# Patient Record
Sex: MALE | Race: WHITE | NOT HISPANIC OR LATINO | Employment: OTHER | URBAN - METROPOLITAN AREA
[De-identification: names, ages, dates, MRNs, and addresses within clinical notes are randomized per-mention and may not be internally consistent; named-entity substitution may affect disease eponyms.]

---

## 2020-06-10 LAB — EXT SARS-COV-2: NOT DETECTED

## 2020-07-07 ENCOUNTER — TELEPHONE (OUTPATIENT)
Dept: GASTROENTEROLOGY | Facility: CLINIC | Age: 60
End: 2020-07-07

## 2020-07-07 ENCOUNTER — OFFICE VISIT (OUTPATIENT)
Dept: GASTROENTEROLOGY | Facility: CLINIC | Age: 60
End: 2020-07-07
Payer: COMMERCIAL

## 2020-07-07 VITALS
BODY MASS INDEX: 39.86 KG/M2 | HEART RATE: 63 BPM | TEMPERATURE: 98.4 F | HEIGHT: 66 IN | WEIGHT: 248 LBS | SYSTOLIC BLOOD PRESSURE: 138 MMHG | DIASTOLIC BLOOD PRESSURE: 78 MMHG

## 2020-07-07 DIAGNOSIS — Z94.0 KIDNEY TRANSPLANT RECIPIENT: ICD-10-CM

## 2020-07-07 DIAGNOSIS — Z12.11 SCREENING FOR COLON CANCER: ICD-10-CM

## 2020-07-07 DIAGNOSIS — R19.7 DIARRHEA, UNSPECIFIED TYPE: Primary | ICD-10-CM

## 2020-07-07 PROCEDURE — 99244 OFF/OP CNSLTJ NEW/EST MOD 40: CPT | Performed by: NURSE PRACTITIONER

## 2020-07-07 RX ORDER — ASPIRIN 325 MG
TABLET ORAL EVERY 24 HOURS
COMMUNITY

## 2020-07-07 RX ORDER — MYCOPHENOLATE MOFETIL 250 MG/1
CAPSULE ORAL EVERY 12 HOURS
COMMUNITY

## 2020-07-07 RX ORDER — AMLODIPINE BESYLATE 10 MG/1
TABLET ORAL EVERY 24 HOURS
COMMUNITY

## 2020-07-07 RX ORDER — CHOLESTYRAMINE 4 G/9G
1 POWDER, FOR SUSPENSION ORAL DAILY
Qty: 39 PACKET | Refills: 1 | Status: SHIPPED | OUTPATIENT
Start: 2020-07-07

## 2020-07-07 RX ORDER — METOPROLOL TARTRATE 50 MG/1
50 TABLET, FILM COATED ORAL 2 TIMES DAILY WITH MEALS
COMMUNITY
Start: 2020-06-17

## 2020-07-07 RX ORDER — REPAGLINIDE 0.5 MG/1
TABLET ORAL
COMMUNITY

## 2020-07-07 NOTE — TELEPHONE ENCOUNTER
Kitty, I prefer for this patient to be done the hospital   I spoke to Chester Saul and I think it would be in his best interest   Could you change him from our center to [facility] for the colonoscopy because he has had a history of a kidney transplant, thank you

## 2020-07-07 NOTE — PROGRESS NOTES
[facility] [facility] - Outpatient Consultation  Fortino Saavedra 60 y o  male MRN: [id number]  Encounter: [id number]    ASSESSMENT AND PLAN:      1  Diarrhea, unspecified type  Acute onset of diarrhea over the past month  Recent hospitalization at [facility] when he presented with diarrhea, dehydration and renal insufficiency  C diff negative at that time  CT scan at that time was negative for any acute pathology or colitis  Consider microscopic colitis or inflammatory bowel disease, hyperthyroidism or celiac disease     - cholestyramine (QUESTRAN) 4 g packet; Take 1 packet (4 g total) by mouth daily  Dispense: 39 packet; Refill: 1  - 2 probiotics daily  -will arrange for a colonoscopy in hospital setting    2  Kidney transplant recipient  On chronic immunosuppression for kidney transplant in January 2018 at [facility]  No longer is following with them  3  Acute on chronic kidney injury   Recent acute kidney injury requiring hospitalization for dehydration  Creatinine during hospitalization was 1 7 to and now has decreased to 1 3  Follows with Dr Elkin Hough from [address] [place] [address]  4  Screening for colon cancer  5  History of colon polyps  Last colonoscopy at the [facility] was in April of 2017 and identified a hyperplastic colon polyp  A 5 year recall advised  Followup Appointment:  Following procedure  ______________________________________________________________________    Chief Complaint   Patient presents with    Diarrhea     1 1/2 hours post prandial; x 1 month    Referred by PCP Dr Dwayne Toledo  Kidney transplant     [facility] kidney transplant       HPI:   Fortino Saavedra is a 60y o  year old male who presents with an acute onset of diarrhea that has been ongoing for the past 3-4 weeks  Postprandial   Associated with gas and bloating  Denies actual abdominal pain, melena rectal bleeding    Does report a sick contact with the sister also having diarrhea  Denies travel  No new medication  Hospitalized at [facility] for 2 days for hydration and  IV antibiotic empirically  Was empirically placed on couple day course of Metronidazole after he left the emergency room  Typically experiencing  2 to 3 times loose stools a day  Can be watery but also soft and mushy at times  Denies night wakening  Initially had fecal incontinence but this has not been an issue for some time  Seems to be slowly improving  Intentional weight loss of 18 lb over the past couple of months on weight watchers  The diarrhea improved yesterday but then returned this morning  He did admit to eating 2 pieces of pizza last evening for supper  Historical Information   Past Medical History:   Diagnosis Date    [facility]     Stage I no treatment necessary    End stage kidney disease ([facility] )     History of, status post kidney transplant January of 2018 at [address] [place] [address] Hypertension      Past Surgical History:   Procedure Laterality Date    COLONOSCOPY  04/12/2017    [facility]  Small hyperplastic colon polyp and internal hemorrhoids    A 5 year recall advised due to prior history of adenomatous colon polyps    NEPHRECTOMY TRANSPLANTED ORGAN       Social History     Substance and Sexual Activity   Alcohol Use Not on file     Social History     Substance and Sexual Activity   Drug Use Not on file     Social History     Tobacco Use   Smoking Status Current Some Day Smoker   Smokeless Tobacco Never Used     Family History   Problem Relation Age of Onset    Obesity Mother     Emphysema Father     Colon cancer Neg Hx     Colon polyps Neg Hx        Meds/Allergies     Current Outpatient Medications:     Acetaminophen 325 MG CAPS    amLODIPine (NORVASC) 10 mg tablet    aspirin 325 mg tablet    metoprolol tartrate (LOPRESSOR) 50 mg tablet    mycophenolate (CELLCEPT) 250 mg capsule    repaglinide (PRANDIN) 0 5 mg tablet    tacrolimus (PROGRAF) 1 mg/ml oral suspension    cholestyramine (QUESTRAN) 4 g packet    No Known Allergies    PHYSICAL EXAM:    Blood pressure 138/78, pulse 63, temperature 98 4 °F (36 9 °C), height 5' 6" (1 676 m), weight 112 kg (248 lb)  Body mass index is 40 03 kg/m²  General Appearance: NAD, cooperative, alert  Eyes: Anicteric  ENT:  Normocephalic - class 2 airway  Neck:  Supple,    Resp:  Clear to auscultation bilaterally; no rales, rhonchi or wheezing; respirations unlabored   CV:  S1 S2, Regular rate and rhythm; no murmur, rub, or gallop  GI:  Soft, non-tender, non-distended; normal bowel sounds; no masses, no organomegaly   Rectal: Deferred  Musculoskeletal: No cyanosis, clubbing or edema  Normal ROM  Skin:  No jaundice, rashes, or lesions   Heme/Lymph: No palpable cervical lymphadenopathy  Psych: Normal affect, good eye contact  Neuro: No gross deficits, AAOx3    Lab Results:   No results found for: WBC, HGB, HCT, MCV, PLT  No results found for: NA, K, CL, CO2, ANIONGAP, BUN, CREATININE, GLUCOSE, GLUF, CALCIUM, CORRECTEDCA, AST, ALT, ALKPHOS, PROT, BILITOT, EGFR  No results found for: IRON, TIBC, FERRITIN  No results found for: LIPASE    Radiology Results:   No results found  REVIEW OF SYSTEMS:    CONSTITUTIONAL: Denies any fever, chills, rigors, and weight loss  HEENT: No earache or tinnitus  Denies hearing loss or visual disturbances  CARDIOVASCULAR: No chest pain or palpitations  RESPIRATORY: Denies any cough, hemoptysis, shortness of breath or dyspnea on exertion  GASTROINTESTINAL: As noted in the History of Present Illness  GENITOURINARY: No problems with urination  Denies any hematuria or dysuria  NEUROLOGIC: No dizziness or vertigo, denies headaches  MUSCULOSKELETAL: Denies any muscle or joint pain  SKIN: Denies skin rashes or itching  ENDOCRINE: Denies excessive thirst  Denies intolerance to heat or cold  PSYCHOSOCIAL: Denies depression or anxiety  Denies any recent memory loss

## 2020-07-08 NOTE — TELEPHONE ENCOUNTER
Called patient and rescheduled for [facility] for next available on 8/11 with Dr Lamberto Campos  Patient aware Diego Turner will contact to schedule covid test and a nurse will call him 8/4  He was very impressed and appreciative of our office and thanked us

## 2020-07-10 NOTE — TELEPHONE ENCOUNTER
Rec'd transplant clearance; pt is to continuie anti-rejection meds despite prep per nephrologist; I confirmed so with pt; he verbalized understanding

## 2020-08-04 ENCOUNTER — CLINICAL SUPPORT (OUTPATIENT)
Dept: GASTROENTEROLOGY | Facility: CLINIC | Age: 60
End: 2020-08-04

## 2020-08-04 VITALS — WEIGHT: 248 LBS | HEIGHT: 66 IN | BODY MASS INDEX: 39.86 KG/M2

## 2020-08-04 DIAGNOSIS — R19.7 DIARRHEA, UNSPECIFIED TYPE: Primary | ICD-10-CM

## 2020-08-04 NOTE — PROGRESS NOTES
Phone prep with pt  Dx: diarrhea  Rx sent to provider for signature  Instructions for colyte given  Meds reviewed  Instructions emailed to pt

## 2020-08-26 ENCOUNTER — TELEPHONE (OUTPATIENT)
Dept: GASTROENTEROLOGY | Facility: CLINIC | Age: 60
End: 2020-08-26

## 2020-08-26 ENCOUNTER — OFFICE VISIT (OUTPATIENT)
Dept: GASTROENTEROLOGY | Facility: CLINIC | Age: 60
End: 2020-08-26
Payer: COMMERCIAL

## 2020-08-26 VITALS
BODY MASS INDEX: 38.09 KG/M2 | HEIGHT: 66 IN | TEMPERATURE: 97.3 F | WEIGHT: 237 LBS | DIASTOLIC BLOOD PRESSURE: 74 MMHG | SYSTOLIC BLOOD PRESSURE: 118 MMHG

## 2020-08-26 DIAGNOSIS — R19.7 DIARRHEA, UNSPECIFIED TYPE: Primary | ICD-10-CM

## 2020-08-26 DIAGNOSIS — Z12.11 SCREENING FOR COLON CANCER: ICD-10-CM

## 2020-08-26 DIAGNOSIS — Z94.0 KIDNEY TRANSPLANT RECIPIENT: ICD-10-CM

## 2020-08-26 PROCEDURE — 99214 OFFICE O/P EST MOD 30 MIN: CPT | Performed by: NURSE PRACTITIONER

## 2020-08-26 NOTE — TELEPHONE ENCOUNTER
Pt was ordered labs, stools, breath test, and then egd  Pt did not want to schedule procedure until after the other tests were complete  egd is to be done at [facility]  Pt had a transplant      ce

## 2020-08-26 NOTE — PROGRESS NOTES
[facility] - Outpatient Follow-up Note  Riddhi Oshea 63 y o  male MRN: [id number]  Encounter: [id number]    ASSESSMENT AND PLAN:      1  Diarrhea, unspecified type  Patient reports a history of diarrhea for the past 3-6 months, new onset  Associated with 30 lb weight loss  Severe requiring 2 recent hospitalizations during this time frame when he presented with diarrhea and subsequent dehydration/renal insufficiency  C diff negative x2  CT scan negative for any acute pathology or colitis  A recent colonoscopy performed 2 weeks ago was negative for microscopic colitis or inflammatory bowel disease  Etiology unclear  Consider celiac disease or thyroid dysregulation  Small intestinal bacterial overgrowth possibility versus a malabsorption issue  Has tried and failed Questran and probiotics  - Advised that he try Imodium as needed  - Calprotectin,Fecal; Future  - Stool Enteric Bacterial Panel by PCR; Future  - Clostridium difficile toxin by PCR; Future  - Pancreatic elastase, fecal; Future  - CBC and differential; Future  - Comprehensive metabolic panel; Future  - Sedimentation rate, automated; Future  - C-reactive protein; Future  - Thyroid Panel With TSH; Future  - Celiac Disease Comprehensive Panel; Future  - Breath test to exclude small intestinal bacterial overgrowth    2  Kidney transplant recipient  On chronic immunosuppressive therapy for history of a kidney transplant in January of 2018 at [facility]  3  Early satiety  Early satiety and postprandial abdominal bloating occasional nausea over the past several months  Denies any vomiting  Exclude peptic ulcer disease     - arrange for an EGD to be performed in a hospital environment    4  Screening for colon cancer  5  History of colon polyps  Recent colonoscopy negative adenomatous colon polyps  However colonoscopy was a suboptimal examination due to poor preparation    A 1 year recall advised  Followup Appointment:  4-6 weeks  ______________________________________________________________________    Chief Complaint   Patient presents with    Follow-up     scope      HPI:    Returns to the office for follow-up ongoing issues with diarrhea  The patient believes this has been ongoing for least 3 months and possibly 6 months  Having 10 watery bowel movements daily  Associated night wakening  Associated 30 lb weight loss during this time frame  One episode of rectal bleeding a week ago  Denies any melena or lower abdominal pain  He has been following a bland diet without any alleviation  He tried probiotics and Questran without any improvement  Does admit to early satiety and postprandial abdominal fullness with occasional nausea without vomiting  Denies dysphagia or  Odynophagia  Historical Information   Past Medical History:   Diagnosis Date    CLL (chronic lymphocytic leukemia) ([place] [address] )     Stage I no treatment necessary    End stage kidney disease ([place] [address] )     History of, status post kidney transplant January of 2018 at [facility] Hypertension      Past Surgical History:   Procedure Laterality Date    COLONOSCOPY  04/12/2017    [facility]  Small hyperplastic colon polyp and internal hemorrhoids    A 5 year recall advised due to prior history of adenomatous colon polyps    NEPHRECTOMY TRANSPLANTED ORGAN       Social History     Substance and Sexual Activity   Alcohol Use None     Social History     Substance and Sexual Activity   Drug Use Not on file     Social History     Tobacco Use   Smoking Status Current Some Day Smoker   Smokeless Tobacco Never Used     Family History   Problem Relation Age of Onset    Obesity Mother     Emphysema Father     Colon cancer Neg Hx     Colon polyps Neg Hx          Current Outpatient Medications:     Acetaminophen 325 MG CAPS    amLODIPine (NORVASC) 10 mg tablet    metoprolol tartrate (LOPRESSOR) 50 mg tablet   mycophenolate (CELLCEPT) 250 mg capsule    repaglinide (PRANDIN) 0 5 mg tablet    tacrolimus (PROGRAF) 1 mg/ml oral suspension    aspirin 325 mg tablet    cholestyramine (QUESTRAN) 4 g packet    polyethylene glycol (COLYTE) 4000 mL solution  No Known Allergies  Reviewed medications and allergies and updated as indicated    PHYSICAL EXAM:    Blood pressure 118/74, temperature (!) 97 3 °F (36 3 °C), height 5' 6" (1 676 m), weight 108 kg (237 lb)  Body mass index is 38 25 kg/m²  General Appearance: NAD, cooperative, alert  Eyes: Anicteric  ENT:  Normocephalic, atraumatic, normal mucosa  Neck:  Appears normal  Resp:  Clear to auscultation bilaterally; no rales, rhonchi or wheezing; respirations unlabored   CV:  S1 S2, Regular rate and rhythm; no murmur, rub, or gallop  GI:  Soft, non-tender, non-distended; normal bowel sounds; no masses, no organomegaly   Rectal: Deferred  Musculoskeletal: No cyanosis, clubbing or edema  Normal ROM  Skin:  No jaundice, rashes, or lesions   Psych: Normal affect, good eye contact  Neuro: No gross deficits, AAOx3    Lab Results:   No results found for: WBC, HGB, HCT, MCV, PLT  No results found for: NA, K, CL, CO2, ANIONGAP, BUN, CREATININE, GLUCOSE, GLUF, CALCIUM, CORRECTEDCA, AST, ALT, ALKPHOS, PROT, BILITOT, EGFR  No results found for: IRON, TIBC, FERRITIN  No results found for: LIPASE    Radiology Results:   No results found

## 2020-08-26 NOTE — PATIENT INSTRUCTIONS
Imodium as needed  Blood work and stool studies  Arrange for an upper endoscopy with small-bowel enteroscopy  Check breath test to exclude small intestinal bacterial overgrowth  Low-fiber diet try to avoid or eliminate caffeine, alcohol, fried, fatty and spicy foods

## 2020-08-28 ENCOUNTER — TELEPHONE (OUTPATIENT)
Dept: GASTROENTEROLOGY | Facility: CLINIC | Age: 60
End: 2020-08-28

## 2020-08-28 NOTE — TELEPHONE ENCOUNTER
Biopsy was negative for microscopic colitis  Addresse at office visit [phone] by Merline Root  Note the report was inadvertently marked  "done"  and and not attested I do not know if it can be gotten back but if so could do said to me to sign?   Thanks

## 2020-08-31 ENCOUNTER — TELEPHONE (OUTPATIENT)
Dept: GASTROENTEROLOGY | Facility: CLINIC | Age: 60
End: 2020-08-31

## 2020-08-31 LAB
ALBUMIN SERPL-MCNC: 4.2 G/DL (ref 3.6–5.1)
ALBUMIN/GLOB SERPL: 2.3 (CALC) (ref 1–2.5)
ALP SERPL-CCNC: 150 U/L (ref 35–144)
ALT SERPL-CCNC: 123 U/L (ref 9–46)
AST SERPL-CCNC: 44 U/L (ref 10–35)
BASOPHILS # BLD AUTO: 85 CELLS/UL (ref 0–200)
BASOPHILS NFR BLD AUTO: 0.4 %
BILIRUB SERPL-MCNC: 0.4 MG/DL (ref 0.2–1.2)
BUN SERPL-MCNC: 46 MG/DL (ref 7–25)
BUN/CREAT SERPL: 18 (CALC) (ref 6–22)
CALCIUM SERPL-MCNC: 10.4 MG/DL (ref 8.6–10.3)
CHLORIDE SERPL-SCNC: 114 MMOL/L (ref 98–110)
CO2 SERPL-SCNC: 17 MMOL/L (ref 20–32)
CREAT SERPL-MCNC: 2.57 MG/DL (ref 0.7–1.25)
CRP SERPL-MCNC: 1.8 MG/L
EOSINOPHIL # BLD AUTO: 212 CELLS/UL (ref 15–500)
EOSINOPHIL NFR BLD AUTO: 1 %
ERYTHROCYTE [DISTWIDTH] IN BLOOD BY AUTOMATED COUNT: 15 % (ref 11–15)
ERYTHROCYTE [SEDIMENTATION RATE] IN BLOOD BY WESTERGREN METHOD: 2 MM/H
FT4I SERPL CALC-MCNC: 2.3 (ref 1.4–3.8)
GLOBULIN SER CALC-MCNC: 1.8 G/DL (CALC) (ref 1.9–3.7)
GLUCOSE SERPL-MCNC: 123 MG/DL (ref 65–139)
HCT VFR BLD AUTO: 40.5 % (ref 38.5–50)
HGB BLD-MCNC: 13.4 G/DL (ref 13.2–17.1)
IGA SERPL-MCNC: <5 MG/DL (ref 70–320)
LYMPHOCYTES # BLD AUTO: ABNORMAL CELLS/UL (ref 850–3900)
LYMPHOCYTES NFR BLD AUTO: 85.8 %
MCH RBC QN AUTO: 29.6 PG (ref 27–33)
MCHC RBC AUTO-ENTMCNC: 33.1 G/DL (ref 32–36)
MCV RBC AUTO: 89.4 FL (ref 80–100)
MICRODELETION SYND BLD/T FISH: ABNORMAL
MONOCYTES # BLD AUTO: 1018 CELLS/UL (ref 200–950)
MONOCYTES NFR BLD AUTO: 4.8 %
NEUTROPHILS # BLD AUTO: 1696 CELLS/UL (ref 1500–7800)
NEUTROPHILS NFR BLD AUTO: 8 %
PLATELET # BLD AUTO: 195 THOUSAND/UL (ref 140–400)
PMV BLD REES-ECKER: 11 FL (ref 7.5–12.5)
POTASSIUM SERPL-SCNC: 4.6 MMOL/L (ref 3.5–5.3)
PROT SERPL-MCNC: 6 G/DL (ref 6.1–8.1)
RBC # BLD AUTO: 4.53 MILLION/UL (ref 4.2–5.8)
SERVICE CMNT-IMP: ABNORMAL
SL AMB EGFR AFRICAN AMERICAN: 30 ML/MIN/1.73M2
SL AMB EGFR NON AFRICAN AMERICAN: 26 ML/MIN/1.73M2
SODIUM SERPL-SCNC: 138 MMOL/L (ref 135–146)
T3RU NFR SERPL: 31 % (ref 22–35)
T4 SERPL-MCNC: 7.3 MCG/DL (ref 4.9–10.5)
TSH SERPL-ACNC: 0.96 MIU/L (ref 0.4–4.5)
TTG IGA SER-ACNC: 1 U/ML
TTG IGG SER-ACNC: 1 U/ML
WBC # BLD AUTO: 21.2 THOUSAND/UL (ref 3.8–10.8)

## 2020-09-01 ENCOUNTER — TELEPHONE (OUTPATIENT)
Dept: GASTROENTEROLOGY | Facility: CLINIC | Age: 60
End: 2020-09-01

## 2020-09-01 DIAGNOSIS — R79.89 ELEVATED LFTS: Primary | ICD-10-CM

## 2020-09-01 NOTE — RESULT ENCOUNTER NOTE
Results given patient  Please send a copy of blood work to his nephrologist, Dr Mookie Kim from [facility]  I will have him repeat LFTs in 2 weeks as he is to get blood work performed at that time    Sent to [facility]

## 2020-09-04 LAB
ALBUMIN SERPL-MCNC: 3.9 G/DL (ref 3.6–5.1)
ALBUMIN/GLOB SERPL: 2.3 (CALC) (ref 1–2.5)
ALP SERPL-CCNC: 136 U/L (ref 35–144)
ALT SERPL-CCNC: 108 U/L (ref 9–46)
AST SERPL-CCNC: 47 U/L (ref 10–35)
BILIRUB DIRECT SERPL-MCNC: 0.1 MG/DL
BILIRUB INDIRECT SERPL-MCNC: 0.2 MG/DL (CALC) (ref 0.2–1.2)
BILIRUB SERPL-MCNC: 0.3 MG/DL (ref 0.2–1.2)
GLOBULIN SER CALC-MCNC: 1.7 G/DL (CALC) (ref 1.9–3.7)
PROT SERPL-MCNC: 5.6 G/DL (ref 6.1–8.1)

## 2020-09-06 LAB
C DIFF TOX GENS STL QL NAA+PROBE: NOT DETECTED
CALPROTECTIN STL-MCNT: <5 MCG/G
ELASTASE PANC STL-MCNT: 348 MCG/G

## 2020-09-09 ENCOUNTER — TELEPHONE (OUTPATIENT)
Dept: GASTROENTEROLOGY | Facility: CLINIC | Age: 60
End: 2020-09-09

## 2020-09-09 NOTE — TELEPHONE ENCOUNTER
Called pt back, he states is very concerned about his diarrhea as he is having difficulty even leaving the house  He could have 4 bowel movements daily and today 2 of them had such urgency that he had accidents  They often look like a "mud pie " He is on antibiotics for positive breath test (has 3 days left) as well as Questran daily  He is on a renal diet that consists mostly of bananas, turkey sandwiches and bread  He did have some fruit last week but that made him cough and vomited X1  He is also having gas as well as heartburn  Denies pain or fever  He is questioning if should change his diet, take anything for his heartburn and is there something else for his frequent diarrhea other than the antibiotics and Questran?     Please advise [phone]

## 2020-09-09 NOTE — TELEPHONE ENCOUNTER
Pt states he has Abx x 10 days/takes morn and night; has loose bowels and is a kidney transplant pt; would like to spk w/ someone about diet recommendations  # [phone]

## 2020-09-14 ENCOUNTER — TELEPHONE (OUTPATIENT)
Dept: GASTROENTEROLOGY | Facility: CLINIC | Age: 60
End: 2020-09-14

## 2020-09-14 NOTE — TELEPHONE ENCOUNTER
Please call patient to schedule a follow-up appointment in regard to diarrhea and also elevated LFTs, thank you

## 2020-09-17 ENCOUNTER — TELEPHONE (OUTPATIENT)
Dept: GASTROENTEROLOGY | Facility: CLINIC | Age: 60
End: 2020-09-17

## 2020-09-17 ENCOUNTER — OFFICE VISIT (OUTPATIENT)
Dept: GASTROENTEROLOGY | Facility: CLINIC | Age: 60
End: 2020-09-17
Payer: COMMERCIAL

## 2020-09-17 VITALS
SYSTOLIC BLOOD PRESSURE: 128 MMHG | TEMPERATURE: 98.2 F | DIASTOLIC BLOOD PRESSURE: 68 MMHG | HEIGHT: 66 IN | HEART RATE: 64 BPM | WEIGHT: 225 LBS | BODY MASS INDEX: 36.16 KG/M2

## 2020-09-17 DIAGNOSIS — R79.89 ELEVATED FERRITIN LEVEL: ICD-10-CM

## 2020-09-17 DIAGNOSIS — Z12.11 SCREENING FOR COLON CANCER: ICD-10-CM

## 2020-09-17 DIAGNOSIS — R79.89 LFTS ABNORMAL: Primary | ICD-10-CM

## 2020-09-17 DIAGNOSIS — Z94.0 KIDNEY TRANSPLANT RECIPIENT: ICD-10-CM

## 2020-09-17 DIAGNOSIS — R19.7 DIARRHEA, UNSPECIFIED TYPE: ICD-10-CM

## 2020-09-17 PROCEDURE — 99214 OFFICE O/P EST MOD 30 MIN: CPT | Performed by: NURSE PRACTITIONER

## 2020-09-17 RX ORDER — LOPERAMIDE HYDROCHLORIDE 2 MG/1
2 TABLET ORAL DAILY
COMMUNITY

## 2020-09-17 NOTE — TELEPHONE ENCOUNTER
Please obtain ultrasound report from [facility] from approximately a month ago, thank you

## 2020-09-17 NOTE — LETTER
September 17, 2020     Nellie Albert MD  [address] [facility]  [address]  [place] [address] [address]    Patient: Grace Diop   YOB: 1960   Date of Visit: 9/17/2020       Dear Dr Arpan Liu: Thank you for referring Grace Diop to me for evaluation  Below are my notes for this consultation  If you have questions, please do not hesitate to call me  I look forward to following your patient along with you  Sincerely,        RUDY Terry        CC: DO Kamila Ortiz CRNP  9/17/2020  4:29 PM  Incomplete    [address] [facility] - [facility]  Grace Diop 63 y o  male MRN: [id number]  Encounter: [id number]    ASSESSMENT AND PLAN:    1  Diarrhea, unspecified type  Diarrhea over the past 4-6 months  Celiac disease and hyperthyroidism excluded via serology  Comprehensive stool studies x2 negative for an infectious etiology  CT scan negative for any acute pathology or colitis  A recent colonoscopy was negative for microscopic colitis or inflammatory bowel disease  Breath test was positive for small intestinal bacterial overgrowth and he was treated with antibiotics with slight improvement  Possibly secondary side effect the "CellCept"  - Questran twice daily  - Imodium as needed    2  LFTs abnormal  Elevated LFTs that have been increasing over the past couple of years  Possibly the side effect of "CellCept"  Exclude autoimmune, metabolic or viral etiologies  Possibly fatty liver  3  Kidney transplant recipient  On chronic immunosuppressive therapy for history of a kidney transplant in January of 2018 at [facility]  4  Screening for colon cancer  5  History of colon polyps  Recent colonoscopy negative adenomatous colon polyps  However colonoscopy was a suboptimal examination due to poor preparation  A 1 year recall advised      Followup Appointment: 2-3 months  ______________________________________________________________________    Chief Complaint   Patient presents with    Follow-up     diarrhea       HPI:   Amber Manning is a 63y o  year old male who presents to the office for follow-up in regard to chronic diarrhea  This began approximately 4-6 months ago  Can have anywhere from 5-7 stools on a daily basis  Initially  watery but now, slowly becoming more formed  Denies abdominal pain, night wakening, rectal bleeding or melena  Denies nausea vomiting  Appetite good  He has lost 30 lb over the past 6 months but had been on weight watchers  Admits to "snorting speed" in the 1970s  Denies jaundice, pruritus, arthralgias, myalgias, skin rashes or lesions  Historical Information   Past Medical History:   Diagnosis Date    CLL (chronic lymphocytic leukemia) ([place] [address] )     Stage I no treatment necessary    End stage kidney disease ([facility] )     History of, status post kidney transplant January of 2018 at [address] [facility] Hypertension      Past Surgical History:   Procedure Laterality Date    COLONOSCOPY  04/12/2017    [place] [facility]  Small hyperplastic colon polyp and internal hemorrhoids  A 5 year recall advised due to prior history of adenomatous colon polyps    COLONOSCOPY  08/13/2020    Negative except for hemorrhoids    A 1 year recall advised due to suboptimal examination due to poor prep    NEPHRECTOMY TRANSPLANTED ORGAN       Social History     Substance and Sexual Activity   Alcohol Use None     Social History     Substance and Sexual Activity   Drug Use Not on file     Social History     Tobacco Use   Smoking Status Current Some Day Smoker   Smokeless Tobacco Never Used     Family History   Problem Relation Age of Onset    Obesity Mother     Emphysema Father     Colon cancer Neg Hx     Colon polyps Neg Hx        Meds/Allergies     Current Outpatient Medications:     Acetaminophen 325 MG CAPS    amLODIPine (NORVASC) 10 mg tablet    aspirin 325 mg tablet    loperamide (Imodium A-D) 2 MG tablet    metoprolol tartrate (LOPRESSOR) 50 mg tablet    mycophenolate (CELLCEPT) 250 mg capsule    repaglinide (PRANDIN) 0 5 mg tablet    tacrolimus (PROGRAF) 1 mg/ml oral suspension    cholestyramine (QUESTRAN) 4 g packet    No Known Allergies    PHYSICAL EXAM:    Blood pressure 128/68, pulse 64, temperature 98 2 °F (36 8 °C), height 5' 6" (1 676 m), weight 102 kg (225 lb)  Body mass index is 36 32 kg/m²  General Appearance: NAD, cooperative, alert  Eyes: Anicteric  ENT:  Normocephalic   Neck: Appears normal  Resp:  Clear to auscultation bilaterally; no rales, rhonchi or wheezing; respirations unlabored   CV:  S1 S2, Regular rate and rhythm; no murmur, rub, or gallop  GI:  Soft, non-tender, non-distended; normal bowel sounds; no masses, no organomegaly   Rectal: Deferred  Skin:  No jaundice, rashes, or lesions   Psych: Normal affect, good eye contact  Neuro: No gross deficits, AAOx3    Lab Results:   Lab Results   Component Value Date    WBC 21 2 (H) 08/27/2020    HGB 13 4 08/27/2020    HCT 40 5 08/27/2020    MCV 89 4 08/27/2020     08/27/2020     Lab Results   Component Value Date    K 4 6 08/27/2020     (H) 08/27/2020    CO2 17 (L) 08/27/2020    BUN 46 (H) 08/27/2020    CREATININE 2 57 (H) 08/27/2020    CALCIUM 10 4 (H) 08/27/2020    AST 47 (H) 09/03/2020     (H) 09/03/2020    ALKPHOS 136 09/03/2020     No results found for: IRON, TIBC, FERRITIN  No results found for: LIPASE    Radiology Results:   No results found  REVIEW OF SYSTEMS:    CONSTITUTIONAL: Denies any fever, chills  Positive for weight loss  HEENT: No earache or tinnitus  Denies hearing loss or visual disturbances  CARDIOVASCULAR: No chest pain or palpitations  RESPIRATORY: Denies any cough, hemoptysis, shortness of breath or dyspnea on exertion  GASTROINTESTINAL: As noted in the History of Present Illness     GENITOURINARY: No problems with urination  Denies any hematuria or dysuria  NEUROLOGIC: No dizziness or vertigo, denies headaches  MUSCULOSKELETAL: Denies any muscle or joint pain  SKIN: Denies skin rashes or itching  ENDOCRINE: Denies excessive thirst  Denies intolerance to heat or cold  PSYCHOSOCIAL: Denies depression or anxiety  Denies any recent memory loss  Durrell Sacks, CRNP  9/17/2020  3:41 PM  Sign when Signing Visit    [address] [employer] [facility] - [facility]patient [facility]  Israel Lord 63 y o  male MRN: [id number]  Encounter: [id number]    ASSESSMENT AND PLAN:    1  Diarrhea, unspecified type    2  LFTs abnormal    3  Kidney transplant recipient  On chronic immunosuppressive therapy for history of a kidney transplant in January of 2018 at [facility]  4  Screening for colon cancer  5  History of colon polyps  Recent colonoscopy negative adenomatous colon polyps  However colonoscopy was a suboptimal examination due to poor preparation  A 1 year recall advised  Followup Appointment: 2-3 months  ______________________________________________________________________    Chief Complaint   Patient presents with    Follow-up     diarrhea       HPI:   Israel Lord is a 63y o  year old male who presents ***    Historical Information   Past Medical History:   Diagnosis Date    CLL [facility]ch[facility] lymphocytic leukemia) ([facility] )     Stage I no treatment necessary    End stage kidney disease ([facility] )     History of, status post kidney transplant January of 2018 at [address] [place] [address] Hypertension      Past Surgical History:   Procedure Laterality Date    COLONOSCOPY  04/12/2017    [place] for [facility]  Small hyperplastic colon polyp and internal hemorrhoids  A 5 year recall advised due to prior history of adenomatous colon polyps    COLONOSCOPY  08/13/2020    Negative except for hemorrhoids    A 1 year recall advised due to suboptimal examination due to poor prep    NEPHRECTOMY TRANSPLANTED ORGAN       Social History     Substance and Sexual Activity   Alcohol Use None     Social History     Substance and Sexual Activity   Drug Use Not on file     Social History     Tobacco Use   Smoking Status Current Some Day Smoker   Smokeless Tobacco Never Used     Family History   Problem Relation Age of Onset    Obesity Mother     Emphysema Father     Colon cancer Neg Hx     Colon polyps Neg Hx        Meds/Allergies     Current Outpatient Medications:     Acetaminophen 325 MG CAPS    amLODIPine (NORVASC) 10 mg tablet    aspirin 325 mg tablet    loperamide (Imodium A-D) 2 MG tablet    metoprolol tartrate (LOPRESSOR) 50 mg tablet    mycophenolate (CELLCEPT) 250 mg capsule    repaglinide (PRANDIN) 0 5 mg tablet    tacrolimus (PROGRAF) 1 mg/ml oral suspension    cholestyramine (QUESTRAN) 4 g packet    No Known Allergies    PHYSICAL EXAM:    Blood pressure 128/68, pulse 64, temperature 98 2 °F (36 8 °C), height 5' 6" (1 676 m), weight 102 kg (225 lb)  Body mass index is 36 32 kg/m²  General Appearance: NAD, cooperative, alert  Eyes: Anicteric  ENT:  Normocephalic   Neck: Appears normal  Resp:  Clear to auscultation bilaterally; no rales, rhonchi or wheezing; respirations unlabored   CV:  S1 S2, Regular rate and rhythm; no murmur, rub, or gallop    GI:  Soft, non-tender, non-distended; normal bowel sounds; no masses, no organomegaly   Rectal: Deferred  Skin:  No jaundice, rashes, or lesions   Psych: Normal affect, good eye contact  Neuro: No gross deficits, AAOx3    Lab Results:   Lab Results   Component Value Date    WBC 21 2 (H) 08/27/2020    HGB 13 4 08/27/2020    HCT 40 5 08/27/2020    MCV 89 4 08/27/2020     08/27/2020     Lab Results   Component Value Date    K 4 6 08/27/2020     (H) 08/27/2020    CO2 17 (L) 08/27/2020    BUN 46 (H) 08/27/2020    CREATININE 2 57 (H) 08/27/2020    CALCIUM 10 4 (H) 08/27/2020    AST 47 (H) 09/03/2020     (H) 09/03/2020    ALKPHOS 136 09/03/2020     No results found for: IRON, TIBC, FERRITIN  No results found for: LIPASE    Radiology Results:   No results found  REVIEW OF SYSTEMS:    CONSTITUTIONAL: Denies any fever, chills  Positive for weight loss  HEENT: No earache or tinnitus  Denies hearing loss or visual disturbances  CARDIOVASCULAR: No chest pain or palpitations  RESPIRATORY: Denies any cough, hemoptysis, shortness of breath or dyspnea on exertion  GASTROINTESTINAL: As noted in the History of Present Illness  GENITOURINARY: No problems with urination  Denies any hematuria or dysuria  NEUROLOGIC: No dizziness or vertigo, denies headaches  MUSCULOSKELETAL: Denies any muscle or joint pain  SKIN: Denies skin rashes or itching  ENDOCRINE: Denies excessive thirst  Denies intolerance to heat or cold  PSYCHOSOCIAL: Denies depression or anxiety  Denies any recent memory loss

## 2020-09-17 NOTE — LETTER
September 17, 2020     Srikanth Anglin MD  [address]  [address]  [place] [address]    Patient: Ravinder Khan   YOB: 1960   Date of Visit: 9/17/2020       Dear Dr Nelida Villa: Thank you for referring Ravinder Khan to me for evaluation  Below are my notes for this consultation  If you have questions, please do not hesitate to call me  I look forward to following your patient along with you  Sincerely,        RUDY Schofield        CC: DO Yaw Ochoa, [address]  9/17/2020  4:30 PM  Sign when Signing Visit    [address] [facility] - Outpatient Consultation  Ravinder Khan 63 y o  male MRN: [id number]  Encounter: [id number]    ASSESSMENT AND PLAN:    1  Diarrhea, unspecified type  Diarrhea over the past 4-6 months  Celiac disease and hyperthyroidism excluded via serology  Comprehensive stool studies x2 negative for an infectious etiology  CT scan negative for any acute pathology or colitis  A recent colonoscopy was negative for microscopic colitis or inflammatory bowel disease  Breath test was positive for small intestinal bacterial overgrowth and he was treated with antibiotics with slight improvement  Possibly secondary side effect the "CellCept"  - Questran twice daily  - Imodium as needed    2  LFTs abnormal  Elevated LFTs that have been increasing over the past couple of years  Possibly the side effect of "CellCept"  Exclude autoimmune, metabolic or viral etiologies  Possibly fatty liver  3  Kidney transplant recipient  On chronic immunosuppressive therapy for history of a kidney transplant in January of 2018 at [facility]  4  Screening for colon cancer  5  History of colon polyps  Recent colonoscopy negative adenomatous colon polyps  However colonoscopy was a suboptimal examination due to poor preparation  A 1 year recall advised      Followup Appointment: 2-3 months  ______________________________________________________________________    Chief Complaint   Patient presents with    Follow-up     diarrhea       HPI:   Ravinder Khan is a 63y o  year old male who presents to the office for follow-up in regard to chronic diarrhea  This began approximately 4-6 months ago  Can have anywhere from 5-7 stools on a daily basis  Initially  watery but now, slowly becoming more formed  Denies abdominal pain, night wakening, rectal bleeding or melena  Denies nausea vomiting  Appetite good  He has lost 30 lb over the past 6 months but had been on weight watchers  Admits to "snorting speed" in the 1970s  Denies jaundice, pruritus, arthralgias, myalgias, skin rashes or lesions  Historical Information   Past Medical History:   Diagnosis Date    CLL (chronic lymphocytic leukemia) ([place] [address] )     Stage I no treatment necessary    End stage kidney disease ([facility] )     History of, status post kidney transplant January of 2018 at [facility] Hypertension      Past Surgical History:   Procedure Laterality Date    COLONOSCOPY  04/12/2017    [place] for GI [facility]  Small hyperplastic colon polyp and internal hemorrhoids  A 5 year recall advised due to prior history of adenomatous colon polyps    COLONOSCOPY  08/13/2020    Negative except for hemorrhoids    A 1 year recall advised due to suboptimal examination due to poor prep    NEPHRECTOMY TRANSPLANTED ORGAN       Social History     Substance and Sexual Activity   Alcohol Use None     Social History     Substance and Sexual Activity   Drug Use Not on file     Social History     Tobacco Use   Smoking Status Current Some Day Smoker   Smokeless Tobacco Never Used     Family History   Problem Relation Age of Onset    Obesity Mother     Emphysema Father     Colon cancer Neg Hx     Colon polyps Neg Hx        Meds/Allergies     Current Outpatient Medications:     Acetaminophen 325 MG CAPS    amLODIPine (NORVASC) 10 mg tablet    aspirin 325 mg tablet    loperamide (Imodium A-D) 2 MG tablet    metoprolol tartrate (LOPRESSOR) 50 mg tablet    mycophenolate (CELLCEPT) 250 mg capsule    repaglinide (PRANDIN) 0 5 mg tablet    tacrolimus (PROGRAF) 1 mg/ml oral suspension    cholestyramine (QUESTRAN) 4 g packet    No Known Allergies    PHYSICAL EXAM:    Blood pressure 128/68, pulse 64, temperature 98 2 °F (36 8 °C), height 5' 6" (1 676 m), weight 102 kg (225 lb)  Body mass index is 36 32 kg/m²  General Appearance: NAD, cooperative, alert  Eyes: Anicteric  ENT:  Normocephalic   Neck: Appears normal  Resp:  Clear to auscultation bilaterally; no rales, rhonchi or wheezing; respirations unlabored   CV:  S1 S2, Regular rate and rhythm; no murmur, rub, or gallop  GI:  Soft, non-tender, non-distended; normal bowel sounds; no masses, no organomegaly   Rectal: Deferred  Skin:  No jaundice, rashes, or lesions   Psych: Normal affect, good eye contact  Neuro: No gross deficits, AAOx3    Lab Results:   Lab Results   Component Value Date    WBC 21 2 (H) 08/27/2020    HGB 13 4 08/27/2020    HCT 40 5 08/27/2020    MCV 89 4 08/27/2020     08/27/2020     Lab Results   Component Value Date    K 4 6 08/27/2020     (H) 08/27/2020    CO2 17 (L) 08/27/2020    BUN 46 (H) 08/27/2020    CREATININE 2 57 (H) 08/27/2020    CALCIUM 10 4 (H) 08/27/2020    AST 47 (H) 09/03/2020     (H) 09/03/2020    ALKPHOS 136 09/03/2020     No results found for: IRON, TIBC, FERRITIN  No results found for: LIPASE    Radiology Results:   No results found  REVIEW OF SYSTEMS:    CONSTITUTIONAL: Denies any fever, chills  Positive for weight loss  HEENT: No earache or tinnitus  Denies hearing loss or visual disturbances  CARDIOVASCULAR: No chest pain or palpitations  RESPIRATORY: Denies any cough, hemoptysis, shortness of breath or dyspnea on exertion  GASTROINTESTINAL: As noted in the History of Present Illness     GENITOURINARY: No problems with urination  Denies any hematuria or dysuria  NEUROLOGIC: No dizziness or vertigo, denies headaches  MUSCULOSKELETAL: Denies any muscle or joint pain  SKIN: Denies skin rashes or itching  ENDOCRINE: Denies excessive thirst  Denies intolerance to heat or cold  PSYCHOSOCIAL: Denies depression or anxiety  Denies any recent memory loss

## 2020-09-17 NOTE — PROGRESS NOTES
[address] [facility] - Outpatient Consultation  Hemalatha Raphael 63 y o  male MRN: [id number]  Encounter: [id number]    ASSESSMENT AND PLAN:    1  Diarrhea, unspecified type  Diarrhea over the past 4-6 months  Celiac disease and hyperthyroidism excluded via serology  Comprehensive stool studies x2 negative for an infectious etiology  CT scan negative for any acute pathology or colitis  A recent colonoscopy was negative for microscopic colitis or inflammatory bowel disease  Breath test was positive for small intestinal bacterial overgrowth and he was treated with antibiotics with slight improvement  Possibly secondary side effect the "CellCept"  - Questran twice daily  - Imodium as needed    2  LFTs abnormal  Elevated LFTs that have been increasing over the past couple of years  Possibly secondary to  the side effect of "CellCept"  Exclude autoimmune, metabolic or viral etiologies  Possibly fatty liver  - obtain hepatic ultrasound was performed at [facility] a month ago  - comprehensive liver serologies to include viral hepatitis panel, antinuclear antibody, anti smooth muscle antibody, antimitochondrial antibody, and iron panel    3  Kidney transplant recipient  On chronic immunosuppressive therapy for history of a kidney transplant in January of 2018 at [facility]  4  Screening for colon cancer  5  History of colon polyps  Recent colonoscopy negative for microscopic colitis or IBD  Additionally,  negative for recurrent adenomatous colon polyps  However, colonoscopy was a suboptimal examination due to poor preparation  A 1 year recall advised      Followup Appointment: 2-3 months  ______________________________________________________________________    Chief Complaint   Patient presents with    Follow-up     diarrhea       HPI:   Hemalatha Raphael is a 63y o  year old male who presents to the office for follow-up in regard to chronic diarrhea  This began approximately 4-6 months ago  Can have anywhere from 5-7 stools on a daily basis  Initially  watery but now, slowly becoming more formed  Denies abdominal pain, night wakening, rectal bleeding or melena  Denies nausea vomiting  Appetite good  He has lost 30 lb over the past 6 months but had been on weight watchers  Admits to "snorting speed" in the 1970s  Denies jaundice, pruritus, arthralgias, myalgias, skin rashes or lesions  Historical Information   Past Medical History:   Diagnosis Date    CLL (chronic lymphocytic leukemia) ([facility] 75 )     Stage I no treatment necessary    End stage kidney disease ([facility] )     History of, status post kidney transplant January of 2018 at [address] Hypertension      Past Surgical History:   Procedure Laterality Date    COLONOSCOPY  04/12/2017    [facility]  Small hyperplastic colon polyp and internal hemorrhoids  A 5 year recall advised due to prior history of adenomatous colon polyps    COLONOSCOPY  08/13/2020    Negative except for hemorrhoids    A 1 year recall advised due to suboptimal examination due to poor prep    NEPHRECTOMY TRANSPLANTED ORGAN       Social History     Substance and Sexual Activity   Alcohol Use None     Social History     Substance and Sexual Activity   Drug Use Not on file     Social History     Tobacco Use   Smoking Status Current Some Day Smoker   Smokeless Tobacco Never Used     Family History   Problem Relation Age of Onset    Obesity Mother     Emphysema Father     Colon cancer Neg Hx     Colon polyps Neg Hx        Meds/Allergies     Current Outpatient Medications:     Acetaminophen 325 MG CAPS    amLODIPine (NORVASC) 10 mg tablet    aspirin 325 mg tablet    loperamide (Imodium A-D) 2 MG tablet    metoprolol tartrate (LOPRESSOR) 50 mg tablet    mycophenolate (CELLCEPT) 250 mg capsule    repaglinide (PRANDIN) 0 5 mg tablet    tacrolimus (PROGRAF) 1 mg/ml oral suspension   cholestyramine (QUESTRAN) 4 g packet    No Known Allergies    PHYSICAL EXAM:    Blood pressure 128/68, pulse 64, temperature 98 2 °F (36 8 °C), height 5' 6" (1 676 m), weight 102 kg (225 lb)  Body mass index is 36 32 kg/m²  General Appearance: NAD, cooperative, alert  Eyes: Anicteric  ENT:  Normocephalic   Neck: Appears normal  Resp:  Clear to auscultation bilaterally; no rales, rhonchi or wheezing; respirations unlabored   CV:  S1 S2, Regular rate and rhythm; no murmur, rub, or gallop  GI:  Soft, non-tender, non-distended; normal bowel sounds; no masses, no organomegaly   Rectal: Deferred  Skin:  No jaundice, rashes, or lesions   Psych: Normal affect, good eye contact  Neuro: No gross deficits, AAOx3    Lab Results:   Lab Results   Component Value Date    WBC 21 2 (H) 08/27/2020    HGB 13 4 08/27/2020    HCT 40 5 08/27/2020    MCV 89 4 08/27/2020     08/27/2020     Lab Results   Component Value Date    K 4 6 08/27/2020     (H) 08/27/2020    CO2 17 (L) 08/27/2020    BUN 46 (H) 08/27/2020    CREATININE 2 57 (H) 08/27/2020    CALCIUM 10 4 (H) 08/27/2020    AST 47 (H) 09/03/2020     (H) 09/03/2020    ALKPHOS 136 09/03/2020     No results found for: IRON, TIBC, FERRITIN  No results found for: LIPASE    Radiology Results:   No results found  REVIEW OF SYSTEMS:    CONSTITUTIONAL: Denies any fever, chills  Positive for weight loss  HEENT: No earache or tinnitus  Denies hearing loss or visual disturbances  CARDIOVASCULAR: No chest pain or palpitations  RESPIRATORY: Denies any cough, hemoptysis, shortness of breath or dyspnea on exertion  GASTROINTESTINAL: As noted in the History of Present Illness  GENITOURINARY: No problems with urination  Denies any hematuria or dysuria  NEUROLOGIC: No dizziness or vertigo, denies headaches  MUSCULOSKELETAL: Denies any muscle or joint pain  SKIN: Denies skin rashes or itching     ENDOCRINE: Denies excessive thirst  Denies intolerance to heat or cold   PSYCHOSOCIAL: Denies depression or anxiety  Denies any recent memory loss

## 2020-09-21 NOTE — TELEPHONE ENCOUNTER
Called Amanda, this pt had a ct scan on 8/16/20 but no US done since 2017  Ct scan results noted in Imaging

## 2020-10-08 LAB
ALBUMIN SERPL-MCNC: 4.1 G/DL (ref 3.6–5.1)
ALBUMIN/GLOB SERPL: 2.4 (CALC) (ref 1–2.5)
ALP SERPL-CCNC: 131 U/L (ref 35–144)
ALT SERPL-CCNC: 93 U/L (ref 9–46)
ANA SER QL IF: NEGATIVE
AST SERPL-CCNC: 49 U/L (ref 10–35)
BILIRUB DIRECT SERPL-MCNC: 0.1 MG/DL
BILIRUB INDIRECT SERPL-MCNC: 0.3 MG/DL (CALC) (ref 0.2–1.2)
BILIRUB SERPL-MCNC: 0.4 MG/DL (ref 0.2–1.2)
FERRITIN SERPL-MCNC: 1800 NG/ML (ref 24–380)
GLOBULIN SER CALC-MCNC: 1.7 G/DL (CALC) (ref 1.9–3.7)
HBV SURFACE AB SERPL IA-ACNC: <5 MIU/ML
HBV SURFACE AG SERPL QL IA: NORMAL
IRON SATN MFR SERPL: 60 % (CALC) (ref 20–48)
IRON SERPL-MCNC: 191 MCG/DL (ref 50–180)
MITOCHONDRIA AB SER QL IF: NEGATIVE
PROT SERPL-MCNC: 5.8 G/DL (ref 6.1–8.1)
SMOOTH MUSCLE AB SER QL IF: NEGATIVE
TIBC SERPL-MCNC: 318 MCG/DL (CALC) (ref 250–425)

## 2020-12-21 ENCOUNTER — TELEPHONE (OUTPATIENT)
Dept: GASTROENTEROLOGY | Facility: CLINIC | Age: 60
End: 2020-12-21

## 2021-01-05 LAB
A2 MACROGLOB SERPL-MCNC: 225 MG/DL (ref 106–279)
ALT SERPL-CCNC: 45 U/L (ref 9–46)
APO A-I SERPL-MCNC: 65 MG/DL (ref 94–176)
BILIRUB SERPL-MCNC: 0.3 MG/DL (ref 0.2–1.2)
DEPRECATED EGG WHITE IGE RAST QL: 0
EGG WHITE IGE QN: <0.1 KU/L
FIBROSIS STAGE SERPL QL: ABNORMAL
GGT SERPL-CCNC: 46 U/L (ref 3–70)
HAPTOGLOB SERPL-MCNC: 126 MG/DL (ref 43–212)
HFE GENE MUT ANL BLD/T: NORMAL
LIVER FIBR SCORE SERPL CALC.FIBROSURE: 0.52
MICRODELETION SYND BLD/T FISH: ABNORMAL
MICRODELETION SYND BLD/T FISH: ABNORMAL
NECROINFLAMMATORY ACT GRADE SERPL QL: ABNORMAL
NECROINFLAMMATORY ACT SCORE SERPL: 0.32
SL AMB FOOTNOTE: ABNORMAL
SL AMB REFERENCE ID: ABNORMAL

## 2021-01-22 ENCOUNTER — TELEPHONE (OUTPATIENT)
Dept: GASTROENTEROLOGY | Facility: CLINIC | Age: 61
End: 2021-01-22

## 2021-01-22 NOTE — TELEPHONE ENCOUNTER
This patient needs a follow-up appointment to review all of this lab work  It was a patient of Madelaine's  Please call to schedule    Could be virtual   HPR
